# Patient Record
Sex: FEMALE | Race: OTHER | HISPANIC OR LATINO | URBAN - METROPOLITAN AREA
[De-identification: names, ages, dates, MRNs, and addresses within clinical notes are randomized per-mention and may not be internally consistent; named-entity substitution may affect disease eponyms.]

---

## 2019-04-20 ENCOUNTER — EMERGENCY (EMERGENCY)
Facility: HOSPITAL | Age: 74
LOS: 0 days | Discharge: HOME | End: 2019-04-20
Attending: EMERGENCY MEDICINE | Admitting: EMERGENCY MEDICINE
Payer: COMMERCIAL

## 2019-04-20 VITALS
HEART RATE: 67 BPM | TEMPERATURE: 98 F | OXYGEN SATURATION: 97 % | DIASTOLIC BLOOD PRESSURE: 69 MMHG | RESPIRATION RATE: 18 BRPM | SYSTOLIC BLOOD PRESSURE: 159 MMHG

## 2019-04-20 DIAGNOSIS — B36.9 SUPERFICIAL MYCOSIS, UNSPECIFIED: ICD-10-CM

## 2019-04-20 DIAGNOSIS — Z79.899 OTHER LONG TERM (CURRENT) DRUG THERAPY: ICD-10-CM

## 2019-04-20 DIAGNOSIS — K42.9 UMBILICAL HERNIA WITHOUT OBSTRUCTION OR GANGRENE: ICD-10-CM

## 2019-04-20 DIAGNOSIS — L02.91 CUTANEOUS ABSCESS, UNSPECIFIED: ICD-10-CM

## 2019-04-20 DIAGNOSIS — E78.00 PURE HYPERCHOLESTEROLEMIA, UNSPECIFIED: ICD-10-CM

## 2019-04-20 DIAGNOSIS — H60.91 UNSPECIFIED OTITIS EXTERNA, RIGHT EAR: ICD-10-CM

## 2019-04-20 DIAGNOSIS — I10 ESSENTIAL (PRIMARY) HYPERTENSION: ICD-10-CM

## 2019-04-20 DIAGNOSIS — E11.9 TYPE 2 DIABETES MELLITUS WITHOUT COMPLICATIONS: ICD-10-CM

## 2019-04-20 DIAGNOSIS — E03.9 HYPOTHYROIDISM, UNSPECIFIED: ICD-10-CM

## 2019-04-20 PROCEDURE — 99283 EMERGENCY DEPT VISIT LOW MDM: CPT

## 2019-04-20 RX ORDER — OFLOXACIN OTIC SOLUTION 3 MG/ML
10 SOLUTION/ DROPS AURICULAR (OTIC) ONCE
Qty: 0 | Refills: 0 | Status: DISCONTINUED | OUTPATIENT
Start: 2019-04-20 | End: 2019-04-20

## 2019-04-20 NOTE — ED PROVIDER NOTE - PHYSICAL EXAMINATION
VITAL SIGNS: I have reviewed nursing notes and confirm.  CONSTITUTIONAL: Well-developed; well-nourished; in no acute distress.  SKIN: see below on abd exam; otherwise, Skin exam is warm and dry, no acute rash.  HEAD: Normocephalic; atraumatic.  EYES: PERRL, EOM intact; conjunctiva and sclera clear.  ENT: No nasal discharge; airway clear. Right external auditory canal with some swelling and erythema; normal TM's  NECK: Supple; non tender.  CARD: S1, S2 normal; no murmurs, gallops, or rubs. Regular rate and rhythm.  RESP: No wheezes, rales or rhonchi.  ABD: Normal bowel sounds; soft; non-distended; non-tender; no hepatosplenomegaly.; No masses or hernia noted on examination; slight scaly red rash around umbilicus without blanching or warmth consistent with tinea  EXT: Normal ROM. No clubbing, cyanosis or edema.  LYMPH: No acute cervical adenopathy.  NEURO: Alert, oriented. Grossly unremarkable. No focal deficits.  PSYCH: Cooperative, appropriate.

## 2019-04-20 NOTE — ED PROVIDER NOTE - NS ED ROS FT
GENERAL: Denies fever/chills, loss of appetite/weight or fatigue  SKIN: Samantha abrasions, lacerations, ecchymosis, slight rash around umbilicus;   HEAD: Denies headache, dizziness or trauma  EYES: Denies blurry vision, diplopia, or photophobia  ENT: Denies earaches, discharge or hearing loss. Denies nasal discharge or epistaxis. Denies sore throat.   CARDIAC: Denies chest pain, palpitations, or SOB.   RESPIRATORY: Denies SOB, cough, hemoptysis or wheezing.   GI: Denies abdominal pain, n/v/d, bloody stools or melena.   : Denies vaginal bleeding/discharge or pelvic pain. Denies testicular/penile pain or discharge. Denies hematuria, dysuria or frequency.   MSK: Denies myalgias, bony deformity or pain.   NEURO: Denies numbness, tingling, weakness.

## 2019-04-20 NOTE — ED PROVIDER NOTE - CARE PLAN
Principal Discharge DX:	Fungal infection of skin  Secondary Diagnosis:	Umbilical hernia  Secondary Diagnosis:	Otitis externa of right ear

## 2019-04-20 NOTE — ED PROVIDER NOTE - CLINICAL SUMMARY MEDICAL DECISION MAKING FREE TEXT BOX
74 yo woman with fungal infection around umbilicus and right otitis externa;  Otherwise normal exam.  Visiting from [place] and will be here for a while.  Abscess of CT scan likely resolved with the antibiotics she took as no evidence of drainage, tenderenss or swelling at this time.  Ofloxacin provided to patient from [facility].  Stable for discharge.  Outpatient medicine and surgery clinic follow up as she will be here for a prolonged stay.

## 2019-04-20 NOTE — ED PROVIDER NOTE - NSFOLLOWUPCLINICS_GEN_ALL_ED_FT
[facility]  Medicine  [address]  [place], [place]   Phone: [phone]  Fax:   Follow Up Time:     [facility]  Surgery  [address] [place] [address]  Phone: [phone]  Fax:   Follow Up Time:

## 2019-04-20 NOTE — ED ADULT NURSE NOTE - OBJECTIVE STATEMENT
Pt presents with abdominal umbilical hernia/abscess. Recently treated by abx in [place]. Denies N/V/D, fever.

## 2019-04-20 NOTE — ED ADULT NURSE NOTE - NSIMPLEMENTINTERV_GEN_ALL_ED
Implemented All Universal Safety Interventions:  [place] to call system. Call bell, personal items and telephone within reach. Instruct patient to call for assistance. Room bathroom lighting operational. Non-slip footwear when patient is off stretcher. Physically safe environment: no spills, clutter or unnecessary equipment. Stretcher in lowest position, wheels locked, appropriate side rails in place.

## 2019-04-20 NOTE — ED PROVIDER NOTE - OBJECTIVE STATEMENT
74 yo woman presents with her daughter for complaint or "I was recently seen in [place] a month ago and told I have an abscess and hernia by my belly button.  It was treated with Cipro for 2 weeks."  Patient's last dose of cipro was about 2 weeks ago.  She has noted some redness around the belly button.  No swelling.  No fever or chills.  No nausea or vomiting.  No diarrhea.  Patient had a open cholecystectomy a few months before and it is felt the abscess and umbilical hernia are related to that.  Patient also complaining of some fullness to her right ear.  No discharge or hearing loss.

## 2019-04-21 DIAGNOSIS — K81.0 ACUTE CHOLECYSTITIS: Chronic | ICD-10-CM

## 2019-05-02 PROBLEM — E03.9 HYPOTHYROIDISM, UNSPECIFIED: Chronic | Status: ACTIVE | Noted: 2019-04-21

## 2019-05-02 PROBLEM — E11.9 TYPE 2 DIABETES MELLITUS WITHOUT COMPLICATIONS: Chronic | Status: ACTIVE | Noted: 2019-04-21

## 2019-05-02 PROBLEM — E78.00 PURE HYPERCHOLESTEROLEMIA, UNSPECIFIED: Chronic | Status: ACTIVE | Noted: 2019-04-21

## 2019-05-02 PROBLEM — I10 ESSENTIAL (PRIMARY) HYPERTENSION: Chronic | Status: ACTIVE | Noted: 2019-04-21

## 2019-05-06 PROBLEM — Z00.00 ENCOUNTER FOR PREVENTIVE HEALTH EXAMINATION: Status: ACTIVE | Noted: 2019-05-06

## 2019-05-23 ENCOUNTER — APPOINTMENT (OUTPATIENT)
Dept: SURGERY | Facility: CLINIC | Age: 74
End: 2019-05-23
Payer: COMMERCIAL

## 2019-05-23 VITALS
DIASTOLIC BLOOD PRESSURE: 82 MMHG | HEIGHT: 60 IN | WEIGHT: 155 LBS | BODY MASS INDEX: 30.43 KG/M2 | SYSTOLIC BLOOD PRESSURE: 138 MMHG

## 2019-05-23 DIAGNOSIS — Q64.4 MALFORMATION OF URACHUS: ICD-10-CM

## 2019-05-23 DIAGNOSIS — Z87.891 PERSONAL HISTORY OF NICOTINE DEPENDENCE: ICD-10-CM

## 2019-05-23 DIAGNOSIS — K43.2 INCISIONAL HERNIA W/OUT OBSTRUCTION OR GANGRENE: ICD-10-CM

## 2019-05-23 DIAGNOSIS — Z82.49 FAMILY HISTORY OF ISCHEMIC HEART DISEASE AND OTHER DISEASES OF THE CIRCULATORY SYSTEM: ICD-10-CM

## 2019-05-23 DIAGNOSIS — Z83.3 FAMILY HISTORY OF DIABETES MELLITUS: ICD-10-CM

## 2019-05-23 DIAGNOSIS — E11.9 TYPE 2 DIABETES MELLITUS W/OUT COMPLICATIONS: ICD-10-CM

## 2019-05-23 DIAGNOSIS — E03.9 HYPOTHYROIDISM, UNSPECIFIED: ICD-10-CM

## 2019-05-23 DIAGNOSIS — I10 ESSENTIAL (PRIMARY) HYPERTENSION: ICD-10-CM

## 2019-05-23 DIAGNOSIS — Z78.9 OTHER SPECIFIED HEALTH STATUS: ICD-10-CM

## 2019-05-23 DIAGNOSIS — E78.00 PURE HYPERCHOLESTEROLEMIA, UNSPECIFIED: ICD-10-CM

## 2019-05-23 PROCEDURE — 99202 OFFICE O/P NEW SF 15 MIN: CPT

## 2019-05-24 PROBLEM — I10 HIGH BLOOD PRESSURE: Status: ACTIVE | Noted: 2019-05-23

## 2019-05-24 PROBLEM — Z83.3 FAMILY HISTORY OF DIABETES MELLITUS: Status: ACTIVE | Noted: 2019-05-23

## 2019-05-24 PROBLEM — E78.00 HIGH CHOLESTEROL: Status: ACTIVE | Noted: 2019-05-23

## 2019-05-24 PROBLEM — E03.9 HYPOTHYROIDISM: Status: ACTIVE | Noted: 2019-05-24

## 2019-05-24 PROBLEM — K43.2 INCISIONAL HERNIA OF ANTERIOR ABDOMINAL WALL WITHOUT OBSTRUCTION OR GANGRENE: Status: ACTIVE | Noted: 2019-05-24

## 2019-05-24 PROBLEM — E11.9 DIABETES: Status: ACTIVE | Noted: 2019-05-23

## 2019-05-24 PROBLEM — Z82.49 FAMILY HISTORY OF HYPERTENSION: Status: ACTIVE | Noted: 2019-05-23

## 2019-05-24 PROBLEM — Z87.891 FORMER SMOKER: Status: ACTIVE | Noted: 2019-05-23

## 2019-05-24 PROBLEM — Z78.9 CAFFEINE USE: Status: ACTIVE | Noted: 2019-05-23

## 2019-05-24 PROBLEM — Q64.4 PERSISTENT UMBILICAL SINUS: Status: ACTIVE | Noted: 2019-05-24

## 2019-05-24 RX ORDER — SIMVASTATIN 80 MG/1
TABLET, FILM COATED ORAL
Refills: 0 | Status: ACTIVE | COMMUNITY

## 2019-05-24 RX ORDER — METFORMIN HYDROCHLORIDE 500 MG/1
500 TABLET, COATED ORAL
Refills: 0 | Status: ACTIVE | COMMUNITY

## 2019-05-24 RX ORDER — ENALAPRIL MALEATE 5 MG/1
TABLET ORAL
Refills: 0 | Status: ACTIVE | COMMUNITY

## 2019-05-24 RX ORDER — LEVOTHYROXINE SODIUM 0.15 MG/1
150 TABLET ORAL
Refills: 0 | Status: ACTIVE | COMMUNITY

## 2019-05-24 RX ORDER — GLIPIZIDE 5 MG/1
5 TABLET, FILM COATED, EXTENDED RELEASE ORAL
Refills: 0 | Status: ACTIVE | COMMUNITY

## 2019-05-24 NOTE — HISTORY OF PRESENT ILLNESS
[de-identified] : The patient comes with her daughter, who provides translation as needed. They declined  use.  A laparoscopic cholecystectomy with open conversion was performed at [facility] in 2014 by Dr. Brandt, after which she had an umbilical infection and was referred by her surgeon for an infectious disease consultation.  Since then she has had intermittent drainage and discomfort in the umbilicus and was at one point seen in the emergency department where she was given some topical medication. There was some improvement but the drainage persists usually somewhat pinkish and not overtly purulent. She has no other abdominal symptoms at this time and her weight is stable.

## 2019-05-24 NOTE — REASON FOR VISIT
[Initial Evaluation] : an initial evaluation [Family Member] : family member [FreeTextEntry1] : umbilical hernia and drainage

## 2019-05-24 NOTE — ASSESSMENT
[FreeTextEntry1] : A small incisional hernia at the umbilicus associated with a chronically draining sinus tract, likely related to retained suture material. Hernia repair is indicated to avoid the possible complications of an untreated hernia, which were explained. Mesh insertion would be avoided. Given her obesity, the depth of her umbilicus and her diabetes she is at high risk for reinfection of the wound and persistence of the drainage, therefore it is advised that she have an umbilectomy at the time of the hernia repair and the cosmetic result was discussed.  She is somewhat reluctant but understands the rationale for this. The procedure was explained in full with its risks and benefits and all their questions were answered.

## 2019-05-24 NOTE — DATA REVIEWED
[FreeTextEntry1] : Report and printed images from a CT scan of the abdomen done in [place] over 2 months ago were reviewed. The hernia defect at the umbilicus and the local inflammatory changes are as noted.

## 2019-05-24 NOTE — PLAN
[FreeTextEntry1] : She will require a preoperative medical evaluation and already has an appointment in the [facility] medical clinic on June 6. A repeat CT scan of the abdomen has been requested to be done at this location and they will return here for reevaluation after the medical evaluation is completed.  We will plan on starting her on oral antibiotics just before surgery. Appropriate precautions and warning signs were advised for the interim.

## 2019-05-24 NOTE — PHYSICAL EXAM
[Normal Thyroid] : the thyroid was normal [Normal Heart Sounds] : normal heart sounds [Normal Breath Sounds] : Normal breath sounds [Normal Rate and Rhythm] : normal rate and rhythm [Abdominal Masses] : No abdominal masses [Abdomen Tenderness] : ~T ~M No abdominal tenderness [de-identified] : no adenopathy [No HSM] : no hepatosplenomegaly [de-identified] : Obese and protuberant but soft. Healed incisions as noted. The periumbilical region demonstrates some excoriation and erythema and scant serosanguineous discharge without odor. The umbilicus is deep but there is no fluctuance or crepitance. A small hernia is palpable. No other hernias are noted, no CVA tenderness.

## 2019-06-06 ENCOUNTER — OUTPATIENT (OUTPATIENT)
Dept: OUTPATIENT SERVICES | Facility: HOSPITAL | Age: 74
LOS: 1 days | Discharge: HOME | End: 2019-06-06

## 2019-06-06 ENCOUNTER — APPOINTMENT (OUTPATIENT)
Dept: GERIATRICS | Facility: CLINIC | Age: 74
End: 2019-06-06

## 2019-06-06 DIAGNOSIS — Z02.9 ENCOUNTER FOR ADMINISTRATIVE EXAMINATIONS, UNSPECIFIED: ICD-10-CM

## 2019-06-06 DIAGNOSIS — K81.0 ACUTE CHOLECYSTITIS: Chronic | ICD-10-CM

## 2019-06-20 ENCOUNTER — APPOINTMENT (OUTPATIENT)
Dept: SURGERY | Facility: CLINIC | Age: 74
End: 2019-06-20

## 2021-04-28 NOTE — ED ADULT NURSE NOTE - NS ED NOTE ABUSE RESPONSE YN
Yes Hydroxyzine Counseling: Patient advised that the medication is sedating and not to drive a car after taking this medication.  Patient informed of potential adverse effects including but not limited to dry mouth, urinary retention, and blurry vision.  The patient verbalized understanding of the proper use and possible adverse effects of hydroxyzine.  All of the patient's questions and concerns were addressed.

## 2021-07-08 NOTE — ED PROVIDER NOTE - PMH
PATIENT WAS HELPED TO THE BEDSIDE COMMODE. PATIENT WAS THEN HELPED INTO BED
AND GIVEN PRN TRAMADOL FOR HER BACK PAIN Diabetes    Hypercholesterolemia    Hypertension    Hypothyroid

## 2021-07-27 NOTE — ED ADULT NURSE NOTE - AS SC BRADEN MOISTURE
What Type Of Note Output Would You Prefer (Optional)?: Bullet Format Has Your Skin Lesion Been Treated?: not been treated Is This A New Presentation, Or A Follow-Up?: Skin Lesions (4) rarely moist